# Patient Record
Sex: MALE | Race: WHITE | NOT HISPANIC OR LATINO | Employment: FULL TIME | URBAN - METROPOLITAN AREA
[De-identification: names, ages, dates, MRNs, and addresses within clinical notes are randomized per-mention and may not be internally consistent; named-entity substitution may affect disease eponyms.]

---

## 2020-05-23 PROCEDURE — U0003 INFECTIOUS AGENT DETECTION BY NUCLEIC ACID (DNA OR RNA); SEVERE ACUTE RESPIRATORY SYNDROME CORONAVIRUS 2 (SARS-COV-2) (CORONAVIRUS DISEASE [COVID-19]), AMPLIFIED PROBE TECHNIQUE, MAKING USE OF HIGH THROUGHPUT TECHNOLOGIES AS DESCRIBED BY CMS-2020-01-R: HCPCS | Performed by: PERSONAL EMERGENCY RESPONSE ATTENDANT

## 2020-05-25 ENCOUNTER — TELEPHONE (OUTPATIENT)
Dept: URGENT CARE | Facility: CLINIC | Age: 25
End: 2020-05-25

## 2022-08-16 ENCOUNTER — HOSPITAL ENCOUNTER (EMERGENCY)
Facility: HOSPITAL | Age: 27
Discharge: HOME | End: 2022-08-16
Attending: EMERGENCY MEDICINE | Admitting: EMERGENCY MEDICINE

## 2022-08-16 ENCOUNTER — APPOINTMENT (OUTPATIENT)
Dept: CT IMAGING | Facility: HOSPITAL | Age: 27
End: 2022-08-16

## 2022-08-16 VITALS
DIASTOLIC BLOOD PRESSURE: 75 MMHG | HEIGHT: 71 IN | HEART RATE: 87 BPM | BODY MASS INDEX: 25.2 KG/M2 | TEMPERATURE: 97.6 F | SYSTOLIC BLOOD PRESSURE: 134 MMHG | RESPIRATION RATE: 18 BRPM | OXYGEN SATURATION: 99 % | WEIGHT: 180 LBS
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
## 2022-08-16 DIAGNOSIS — S06.0X0A CONCUSSION WITHOUT LOSS OF CONSCIOUSNESS, INITIAL ENCOUNTER: ICD-10-CM

## 2022-08-16 DIAGNOSIS — S00.83XA CONTUSION OF FACE, INITIAL ENCOUNTER: ICD-10-CM

## 2022-08-16 DIAGNOSIS — S09.90XA INJURY OF HEAD, INITIAL ENCOUNTER: Primary | ICD-10-CM

## 2022-08-16 PROCEDURE — 63710000001 ONDANSETRON PER 8 MG: Performed by: PHYSICIAN ASSISTANT

## 2022-08-16 PROCEDURE — 99283 EMERGENCY DEPT VISIT LOW MDM: CPT

## 2022-08-16 PROCEDURE — 70450 CT HEAD/BRAIN W/O DYE: CPT

## 2022-08-16 PROCEDURE — 70486 CT MAXILLOFACIAL W/O DYE: CPT

## 2022-08-16 RX ORDER — IBUPROFEN 800 MG/1
800 TABLET ORAL EVERY 8 HOURS PRN
Qty: 30 TABLET | Refills: 0 | Status: SHIPPED | OUTPATIENT
Start: 2022-08-16

## 2022-08-16 RX ORDER — ONDANSETRON 4 MG/1
4 TABLET, FILM COATED ORAL ONCE
Status: COMPLETED | OUTPATIENT
Start: 2022-08-16 | End: 2022-08-16

## 2022-08-16 RX ORDER — ACETAMINOPHEN 500 MG
1000 TABLET ORAL ONCE
Status: COMPLETED | OUTPATIENT
Start: 2022-08-16 | End: 2022-08-16

## 2022-08-16 RX ORDER — ONDANSETRON 4 MG/1
4 TABLET, ORALLY DISINTEGRATING ORAL EVERY 6 HOURS PRN
Qty: 15 TABLET | Refills: 0 | Status: SHIPPED | OUTPATIENT
Start: 2022-08-16

## 2022-08-16 RX ADMIN — ACETAMINOPHEN 1000 MG: 500 TABLET, FILM COATED ORAL at 15:25

## 2022-08-16 RX ADMIN — ONDANSETRON HYDROCHLORIDE 4 MG: 4 TABLET, FILM COATED ORAL at 15:26
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
## 2025-02-17 ENCOUNTER — HOSPITAL ENCOUNTER (EMERGENCY)
Facility: HOSPITAL | Age: 30
Discharge: HOME | End: 2025-02-17
Attending: EMERGENCY MEDICINE | Admitting: EMERGENCY MEDICINE
Payer: MEDICAID

## 2025-02-17 VITALS
BODY MASS INDEX: 24.5 KG/M2 | HEART RATE: 57 BPM | HEIGHT: 71 IN | RESPIRATION RATE: 18 BRPM | DIASTOLIC BLOOD PRESSURE: 78 MMHG | OXYGEN SATURATION: 99 % | SYSTOLIC BLOOD PRESSURE: 115 MMHG | WEIGHT: 175 LBS | TEMPERATURE: 98.3 F

## 2025-02-17 DIAGNOSIS — J02.9 VIRAL PHARYNGITIS: Primary | ICD-10-CM

## 2025-02-17 LAB
FLUAV RNA RESP QL NAA+PROBE: NOT DETECTED
FLUBV RNA RESP QL NAA+PROBE: NOT DETECTED
S PYO AG THROAT QL: NEGATIVE
SARS-COV-2 RNA RESP QL NAA+PROBE: NOT DETECTED

## 2025-02-17 PROCEDURE — 87081 CULTURE SCREEN ONLY: CPT | Performed by: EMERGENCY MEDICINE

## 2025-02-17 PROCEDURE — 99283 EMERGENCY DEPT VISIT LOW MDM: CPT

## 2025-02-17 PROCEDURE — 87880 STREP A ASSAY W/OPTIC: CPT | Performed by: EMERGENCY MEDICINE

## 2025-02-17 PROCEDURE — 87636 SARSCOV2 & INF A&B AMP PRB: CPT | Performed by: EMERGENCY MEDICINE

## 2025-02-17 RX ORDER — ACETAMINOPHEN 500 MG
1000 TABLET ORAL ONCE
Status: COMPLETED | OUTPATIENT
Start: 2025-02-17 | End: 2025-02-17

## 2025-02-17 RX ADMIN — ACETAMINOPHEN 1000 MG: 500 TABLET ORAL at 11:05

## 2025-02-17 NOTE — ED PROVIDER NOTES
"Subjective   History of Present Illness  29-year-old male presents for evaluation of sore throat and congestion.  He is a college student.  He denies any known exposures to anyone with COVID-19.  He tells me that he has been experiencing symptoms for the past 2 days.  He did not know if his symptoms could be related to him \"quitting smoking recently.\"  Given his ongoing symptoms, he was concerned about infection or strep throat and came here to the ED to be evaluated.  He otherwise feels well.  No fevers.  He is tolerating p.o.      Review of Systems   HENT:  Positive for congestion and sore throat.    All other systems reviewed and are negative.      Past Medical History:   Diagnosis Date    Bipolar disease, manic     Depression     PTSD (post-traumatic stress disorder)        Allergies   Allergen Reactions    Sulfa Antibiotics Rash       No past surgical history on file.    No family history on file.    Social History     Socioeconomic History    Marital status: Single   Tobacco Use    Smoking status: Every Day     Current packs/day: 0.50     Types: Cigarettes    Smokeless tobacco: Never   Vaping Use    Vaping status: Never Used   Substance and Sexual Activity    Alcohol use: Yes     Comment: socially    Drug use: Never    Sexual activity: Defer           Objective   Physical Exam  Vitals and nursing note reviewed.   Constitutional:       General: He is not in acute distress.     Appearance: Normal appearance. He is well-developed. He is not diaphoretic.      Comments: Well-appearing male in no acute distress   HENT:      Head: Normocephalic and atraumatic.      Comments: Posterior oropharynx is clear, uvula is midline, normal voice, no trismus, no mucous membrane lesions noted, no tonsillar exudates present  Neck:      Vascular: No JVD.      Comments: No meningeal signs or nuchal rigidity  Cardiovascular:      Rate and Rhythm: Normal rate and regular rhythm.      Heart sounds: Normal heart sounds. No murmur " heard.     No friction rub. No gallop.   Pulmonary:      Effort: Pulmonary effort is normal. No respiratory distress.      Breath sounds: Normal breath sounds. No wheezing or rales.   Musculoskeletal:         General: Normal range of motion.      Cervical back: Normal range of motion.   Skin:     General: Skin is warm and dry.      Coloration: Skin is not pale.      Findings: No erythema or rash.   Neurological:      Mental Status: He is alert and oriented to person, place, and time.      Comments: Normal gait   Psychiatric:         Thought Content: Thought content normal.         Judgment: Judgment normal.         Procedures           ED Course  ED Course as of 02/17/25 1539   Mon Feb 17, 2025   1034 29-year-old male presents for evaluation of sore throat and congestion for the past 2 days.  Of note, he is a college student.  He denies any known exposures to anyone with COVID-19.  On arrival to the ED, the patient is nontoxic-appearing.  He has mild erythema noted to his posterior oropharynx.  Uvula is midline.  No mucous membrane lesions.  No tonsillar exudates noted.  We will obtain a strep swab and a viral swab and will reassess following initial interventions. [DD]   1136 Strep swab is negative. [DD]   1144 Viral swab is negative for COVID-19 or influenza. [DD]   1144 Patient reassured and counseled regarding symptomatic management of viral pharyngitis.  He will follow-up with his primary care physician within the next week.  Agreeable with plan and given appropriate strict return precautions. [DD]      ED Course User Index  [DD] Thong Quinonez MD                                             Recent Results (from the past 24 hours)   COVID-19 and FLU A/B PCR, 1 HR TAT - Swab, Nasopharynx    Collection Time: 02/17/25 11:05 AM    Specimen: Nasopharynx; Swab   Result Value Ref Range    COVID19 Not Detected Not Detected - Ref. Range    Influenza A PCR Not Detected Not Detected    Influenza B PCR Not Detected  Not Detected   Rapid Strep A Screen - Swab, Throat    Collection Time: 02/17/25 11:05 AM    Specimen: Throat; Swab   Result Value Ref Range    Strep A Ag Negative Negative     Note: In addition to lab results from this visit, the labs listed above may include labs taken at another facility or during a different encounter within the last 24 hours. Please correlate lab times with ED admission and discharge times for further clarification of the services performed during this visit.    No orders to display     Vitals:    02/17/25 1110 02/17/25 1111 02/17/25 1130 02/17/25 1132   BP:   115/78    BP Location:       Patient Position:       Pulse: 73 61 52 57   Resp:       Temp:       TempSrc:       SpO2: 98% 100% 99% 99%   Weight:       Height:         Medications   acetaminophen (TYLENOL) tablet 1,000 mg (1,000 mg Oral Given 2/17/25 1105)     ECG/EMG Results (last 24 hours)       ** No results found for the last 24 hours. **          No orders to display                 Medical Decision Making  Problems Addressed:  Viral pharyngitis: acute illness or injury    Risk  OTC drugs.        Final diagnoses:   Viral pharyngitis       ED Disposition  ED Disposition       ED Disposition   Discharge    Condition   Stable    Comment   --               PATIENT CONNECTION - [facility] [address]  [phone]  In 1 week           Medication List      No changes were made to your prescriptions during this visit.            Thong Quinonez MD  02/17/25 3242     Yes

## 2025-02-19 LAB — BACTERIA SPEC AEROBE CULT: NORMAL
